# Patient Record
Sex: FEMALE | Race: ASIAN | NOT HISPANIC OR LATINO | URBAN - METROPOLITAN AREA
[De-identification: names, ages, dates, MRNs, and addresses within clinical notes are randomized per-mention and may not be internally consistent; named-entity substitution may affect disease eponyms.]

---

## 2021-08-06 PROBLEM — E11.311 NONPROLIFERATIVE DIABETIC RETINOPATHY: Noted: 2021-08-06

## 2021-08-06 PROBLEM — H43.21 ASTEROID HYALOSIS: Noted: 2022-02-08

## 2021-08-06 PROBLEM — E11.3513 PROLIFERATIVE DIABETIC RETINOPATHY: Noted: 2022-02-08

## 2021-08-06 PROBLEM — E11.311 DIABETES, TYPE II WITH OCULAR COMPLICATIONS: Noted: 2022-02-08

## 2021-08-06 PROBLEM — H25.13 NS CATARACT: Noted: 2022-02-08

## 2021-08-06 PROBLEM — H43.13 VITREOUS HEMORRHAGE: Noted: 2022-02-08

## 2021-08-06 PROBLEM — E11.3513 DIABETES, TYPE II WITH OCULAR COMPLICATIONS: Noted: 2022-02-08

## 2021-08-06 PROBLEM — E11.311 DIABETIC MACULAR EDEMA: Noted: 2022-02-11

## 2021-08-06 PROBLEM — E11.3513 DIABETIC MACULAR EDEMA: Noted: 2022-02-11

## 2021-09-22 ENCOUNTER — APPOINTMENT (RX ONLY)
Dept: URBAN - METROPOLITAN AREA CLINIC 38 | Facility: CLINIC | Age: 61
Setting detail: DERMATOLOGY
End: 2021-09-22

## 2021-09-22 DIAGNOSIS — Z00.00 ENCOUNTER FOR GENERAL ADULT MEDICAL EXAMINATION WITHOUT ABNORMAL FINDINGS: ICD-10-CM

## 2021-09-22 DIAGNOSIS — L98.8 OTHER SPECIFIED DISORDERS OF THE SKIN AND SUBCUTANEOUS TISSUE: ICD-10-CM

## 2021-09-22 DIAGNOSIS — I78.8 OTHER DISEASES OF CAPILLARIES: ICD-10-CM

## 2021-09-22 PROBLEM — Z71.1 PERSON WITH FEARED HEALTH COMPLAINT IN WHOM NO DIAGNOSIS IS MADE: Status: ACTIVE | Noted: 2021-09-22

## 2021-09-22 PROCEDURE — ? PRESCRIPTION

## 2021-09-22 PROCEDURE — ? OTHER

## 2021-09-22 PROCEDURE — ? TREATMENT REGIMEN

## 2021-09-22 PROCEDURE — 99202 OFFICE O/P NEW SF 15 MIN: CPT

## 2021-09-22 PROCEDURE — ? ADDITIONAL NOTES

## 2021-09-22 PROCEDURE — ? COUNSELING

## 2021-09-22 RX ORDER — HYDROQUINONE 4 %
CREAM (GRAM) TOPICAL
Qty: 30 | Refills: 0 | Status: ERX | COMMUNITY
Start: 2021-09-22

## 2021-09-22 RX ADMIN — Medication: at 00:00

## 2021-09-22 ASSESSMENT — LOCATION DETAILED DESCRIPTION DERM
LOCATION DETAILED: NASAL TIP
LOCATION DETAILED: LEFT VENTRAL DISTAL FOREARM
LOCATION DETAILED: RIGHT VENTRAL DISTAL FOREARM
LOCATION DETAILED: LEFT SUPERIOR MEDIAL MALAR CHEEK
LOCATION DETAILED: RIGHT CENTRAL BUCCAL CHEEK
LOCATION DETAILED: RIGHT SUPERIOR MEDIAL MALAR CHEEK
LOCATION DETAILED: LEFT CENTRAL BUCCAL CHEEK

## 2021-09-22 ASSESSMENT — LOCATION SIMPLE DESCRIPTION DERM
LOCATION SIMPLE: RIGHT FOREARM
LOCATION SIMPLE: LEFT CHEEK
LOCATION SIMPLE: NOSE
LOCATION SIMPLE: LEFT FOREARM
LOCATION SIMPLE: RIGHT CHEEK

## 2021-09-22 ASSESSMENT — LOCATION ZONE DERM
LOCATION ZONE: ARM
LOCATION ZONE: NOSE
LOCATION ZONE: FACE

## 2021-09-22 NOTE — PROCEDURE: OTHER
Other (Free Text): To plastics to discuss treatment options
Detail Level: Detailed
Render Risk Assessment In Note?: no
Note Text (......Xxx Chief Complaint.): This diagnosis correlates with the
Other (Free Text): Advised that when a new lesion appears to RV for exam/ biopsy as indicated

## 2021-09-22 NOTE — PROCEDURE: TREATMENT REGIMEN
Plan: There is no growth or rough skin at all present today or per history. This is a flat blanching patch. Discussed she should have ENT to internal exam to ensure everything looks normal. \\nDiscussed laser options.
Detail Level: Zone
Plan: Filler discussed

## 2021-09-22 NOTE — PROCEDURE: MIPS QUALITY
Quality 110: Preventive Care And Screening: Influenza Immunization: Influenza Immunization previously received during influenza season
Quality 226: Preventive Care And Screening: Tobacco Use: Screening And Cessation Intervention: Patient screened for tobacco use and is an ex/non-smoker
Quality 130: Documentation Of Current Medications In The Medical Record: Current Medications Documented
Detail Level: Detailed
Quality 431: Preventive Care And Screening: Unhealthy Alcohol Use - Screening: Patient not identified as an unhealthy alcohol user when screened for unhealthy alcohol use using a systematic screening method

## 2021-09-22 NOTE — HPI: SKIN LESION
What Type Of Note Output Would You Prefer (Optional)?: Standard Output
How Severe Is Your Skin Lesion?: moderate
Has Your Skin Lesion Been Treated?: not been treated
Is This A New Presentation, Or A Follow-Up?: Skin Lesion
Additional History: Pt tried Triamcinolone, didn’t help.\\n\\nPatient was screened for COVID-19. Patient answered No to coming in contact with someone with COVID-19, and hasn’t tested positive for COVID-19.
How Severe Is Your Skin Lesion?: mild
Is This A New Presentation, Or A Follow-Up?: Growth

## 2022-02-08 PROBLEM — H43.21 ASTEROID HYALOSIS: Noted: 2022-02-08

## 2022-02-08 PROBLEM — E11.3513 DIABETIC MACULAR EDEMA: Status: RESOLVED | Noted: 2022-02-11 | Resolved: 2022-02-11

## 2022-02-08 PROBLEM — E11.3553 STABLE PDR: Noted: 2022-02-08 | Resolved: 2022-02-11

## 2022-02-08 PROBLEM — E11.3513 DIABETES, TYPE II WITH OCULAR COMPLICATIONS: Noted: 2022-02-08 | Resolved: 2022-02-11

## 2022-02-08 PROBLEM — E11.3553 DIABETIC MACULAR EDEMA: Status: RESOLVED | Noted: 2022-02-11 | Resolved: 2022-02-11

## 2022-02-08 PROBLEM — E11.3513 STABLE PDR: Noted: 2022-02-08

## 2022-02-08 PROBLEM — E11.3553 DIABETIC MACULAR EDEMA: Noted: 2022-02-11

## 2022-02-08 PROBLEM — H25.13 NUCLEAR SCLEROSIS: Noted: 2022-02-08 | Resolved: 2022-02-11

## 2022-02-08 PROBLEM — E11.3513 DIABETES, TYPE II WITH OCULAR COMPLICATIONS: Noted: 2022-02-08

## 2022-02-08 PROBLEM — E11.3513 STABLE PDR: Noted: 2022-02-08 | Resolved: 2022-02-11

## 2022-02-08 PROBLEM — H25.13 NS CATARACT: Noted: 2022-02-08

## 2022-02-08 PROBLEM — E11.3513 DIABETIC MACULAR EDEMA: Noted: 2022-02-11

## 2022-02-08 PROBLEM — E11.3553 DIABETES, TYPE II WITH OCULAR COMPLICATIONS: Noted: 2022-02-08 | Resolved: 2022-02-11

## 2022-02-08 PROBLEM — E11.3553 DIABETES, TYPE II WITH OCULAR COMPLICATIONS: Noted: 2022-02-08

## 2022-02-08 PROBLEM — H43.21 ASTEROID HYALOSIS: Noted: 2022-02-08 | Resolved: 2022-02-11

## 2022-02-08 PROBLEM — H43.13 VITREOUS HEMORRHAGE: Noted: 2022-02-08

## 2022-02-08 PROBLEM — H25.13 NS CATARACT: Noted: 2022-02-08 | Resolved: 2022-02-11

## 2022-02-08 PROBLEM — E11.3553 STABLE PDR: Noted: 2022-02-08

## 2022-02-08 PROBLEM — E11.3513 DIABETIC MACULAR EDEMA: Noted: 2022-02-11 | Resolved: 2022-02-11

## 2022-02-08 PROBLEM — E11.3553 DIABETIC MACULAR EDEMA: Noted: 2022-02-11 | Resolved: 2022-02-11

## 2022-02-11 ENCOUNTER — PREPPED CHART (OUTPATIENT)
Dept: URBAN - METROPOLITAN AREA CLINIC 101 | Facility: CLINIC | Age: 62
End: 2022-02-11

## 2022-02-11 PROBLEM — E11.3513 STABLE PDR: Noted: 2022-02-08 | Resolved: 2022-02-11

## 2022-02-11 PROBLEM — E11.3553 DIABETIC MACULAR EDEMA: Status: RESOLVED | Noted: 2022-02-11 | Resolved: 2022-02-11

## 2022-02-11 PROBLEM — H43.21 ASTEROID HYALOSIS: Noted: 2022-02-08 | Resolved: 2022-02-11

## 2022-02-11 PROBLEM — E11.3513 DIABETIC MACULAR EDEMA: Status: RESOLVED | Noted: 2022-02-11 | Resolved: 2022-02-11

## 2022-02-11 PROBLEM — E11.3553 STABLE PDR: Noted: 2022-02-08 | Resolved: 2022-02-11

## 2022-02-11 PROBLEM — E11.3553 DIABETES, TYPE II WITH OCULAR COMPLICATIONS: Noted: 2022-02-08 | Resolved: 2022-02-11

## 2022-02-11 PROBLEM — E11.3513 DIABETES, TYPE II WITH OCULAR COMPLICATIONS: Noted: 2022-02-08 | Resolved: 2022-02-11

## 2022-02-11 PROBLEM — H25.12 NUCLEAR SCLEROSIS: Noted: 2022-02-08 | Resolved: 2022-02-11

## 2022-02-11 PROBLEM — H25.13 NUCLEAR SCLEROSIS: Noted: 2022-02-08 | Resolved: 2022-02-11

## 2022-03-07 ASSESSMENT — VISUAL ACUITY
OS_CC: 20/40
OD_CC: 20/50-1

## 2022-03-07 ASSESSMENT — TONOMETRY
OS_IOP_MMHG: 16
OD_IOP_MMHG: 18

## 2022-03-11 ENCOUNTER — FOLLOW UP (OUTPATIENT)
Dept: URBAN - METROPOLITAN AREA CLINIC 101 | Facility: CLINIC | Age: 62
End: 2022-03-11

## 2022-03-11 DIAGNOSIS — E11.3553: ICD-10-CM

## 2022-03-11 DIAGNOSIS — E11.3513: ICD-10-CM

## 2022-03-11 DIAGNOSIS — H43.21: ICD-10-CM

## 2022-03-11 DIAGNOSIS — H25.13: ICD-10-CM

## 2022-03-11 PROCEDURE — 92202 OPSCPY EXTND ON/MAC DRAW: CPT | Mod: 59

## 2022-03-11 PROCEDURE — 67028 INJECTION EYE DRUG: CPT

## 2022-03-11 PROCEDURE — PFS EYLEA PFS

## 2022-03-11 PROCEDURE — 92134 CPTRZ OPH DX IMG PST SGM RTA: CPT

## 2022-03-11 PROCEDURE — 92014 COMPRE OPH EXAM EST PT 1/>: CPT | Mod: 25

## 2022-03-11 ASSESSMENT — TONOMETRY
OD_IOP_MMHG: 14
OS_IOP_MMHG: 14

## 2022-03-11 ASSESSMENT — VISUAL ACUITY
OD_CC: 20/50
OS_CC: 20/40+1

## 2022-03-15 ENCOUNTER — INJECTION ONLY (OUTPATIENT)
Dept: URBAN - METROPOLITAN AREA CLINIC 101 | Facility: CLINIC | Age: 62
End: 2022-03-15

## 2022-03-15 DIAGNOSIS — E11.3513: ICD-10-CM

## 2022-03-15 PROCEDURE — 67028 INJECTION EYE DRUG: CPT

## 2022-03-15 PROCEDURE — PFS EYLEA PFS

## 2022-03-15 ASSESSMENT — VISUAL ACUITY
OD_CC: 20/30
OS_CC: 20/25-2

## 2022-03-15 ASSESSMENT — TONOMETRY
OS_IOP_MMHG: 20
OD_IOP_MMHG: 18

## 2022-04-12 ENCOUNTER — FOLLOW UP (OUTPATIENT)
Dept: URBAN - METROPOLITAN AREA CLINIC 101 | Facility: CLINIC | Age: 62
End: 2022-04-12

## 2022-04-12 DIAGNOSIS — E11.3553: ICD-10-CM

## 2022-04-12 DIAGNOSIS — E11.3513: ICD-10-CM

## 2022-04-12 DIAGNOSIS — H43.13: ICD-10-CM

## 2022-04-12 PROCEDURE — 67028 INJECTION EYE DRUG: CPT

## 2022-04-12 PROCEDURE — PFS EYLEA PFS

## 2022-04-12 PROCEDURE — 92202 OPSCPY EXTND ON/MAC DRAW: CPT | Mod: 59

## 2022-04-12 PROCEDURE — 92014 COMPRE OPH EXAM EST PT 1/>: CPT | Mod: 25

## 2022-04-12 PROCEDURE — 92134 CPTRZ OPH DX IMG PST SGM RTA: CPT

## 2022-04-12 ASSESSMENT — VISUAL ACUITY
OD_CC: 20/40-1
OS_CC: 20/25+2
OD_PH: 20/30-1

## 2022-04-12 ASSESSMENT — TONOMETRY
OS_IOP_MMHG: 10
OD_IOP_MMHG: 11

## 2022-04-19 ENCOUNTER — LASER ONLY (OUTPATIENT)
Dept: URBAN - METROPOLITAN AREA CLINIC 101 | Facility: CLINIC | Age: 62
End: 2022-04-19

## 2022-04-19 DIAGNOSIS — H43.13: ICD-10-CM

## 2022-04-19 PROCEDURE — 67228 TREATMENT X10SV RETINOPATHY: CPT

## 2022-04-19 ASSESSMENT — TONOMETRY: OD_IOP_MMHG: 14

## 2022-04-19 ASSESSMENT — VISUAL ACUITY: OD_CC: 20/40-1

## 2022-05-17 ENCOUNTER — FOLLOW UP (OUTPATIENT)
Dept: URBAN - METROPOLITAN AREA CLINIC 101 | Facility: CLINIC | Age: 62
End: 2022-05-17

## 2022-05-17 DIAGNOSIS — E11.3553: ICD-10-CM

## 2022-05-17 DIAGNOSIS — E11.3513: ICD-10-CM

## 2022-05-17 DIAGNOSIS — H43.13: ICD-10-CM

## 2022-05-17 DIAGNOSIS — H25.13: ICD-10-CM

## 2022-05-17 DIAGNOSIS — H43.21: ICD-10-CM

## 2022-05-17 PROCEDURE — 92202 OPSCPY EXTND ON/MAC DRAW: CPT

## 2022-05-17 PROCEDURE — 92134 CPTRZ OPH DX IMG PST SGM RTA: CPT

## 2022-05-17 PROCEDURE — 92014 COMPRE OPH EXAM EST PT 1/>: CPT

## 2022-05-17 ASSESSMENT — VISUAL ACUITY
OS_CC: 20/25-1
OD_CC: 20/30-1

## 2022-05-17 ASSESSMENT — TONOMETRY
OS_IOP_MMHG: 18
OD_IOP_MMHG: 19

## 2022-06-24 ENCOUNTER — FOLLOW UP (OUTPATIENT)
Dept: URBAN - METROPOLITAN AREA CLINIC 101 | Facility: CLINIC | Age: 62
End: 2022-06-24

## 2022-06-24 DIAGNOSIS — E11.3553: ICD-10-CM

## 2022-06-24 DIAGNOSIS — E11.3513: ICD-10-CM

## 2022-06-24 DIAGNOSIS — H43.13: ICD-10-CM

## 2022-06-24 DIAGNOSIS — H25.13: ICD-10-CM

## 2022-06-24 PROCEDURE — 92134 CPTRZ OPH DX IMG PST SGM RTA: CPT

## 2022-06-24 PROCEDURE — PFS EYLEA PFS

## 2022-06-24 PROCEDURE — 67028 INJECTION EYE DRUG: CPT

## 2022-06-24 PROCEDURE — 92014 COMPRE OPH EXAM EST PT 1/>: CPT | Mod: 25

## 2022-06-24 PROCEDURE — 92202 OPSCPY EXTND ON/MAC DRAW: CPT | Mod: 59

## 2022-06-24 ASSESSMENT — VISUAL ACUITY
OS_CC: 20/30
OD_CC: 20/50+2

## 2022-06-24 ASSESSMENT — TONOMETRY
OD_IOP_MMHG: 14
OS_IOP_MMHG: 16

## 2022-07-22 ENCOUNTER — FOLLOW UP (OUTPATIENT)
Dept: URBAN - METROPOLITAN AREA CLINIC 101 | Facility: CLINIC | Age: 62
End: 2022-07-22

## 2022-07-22 DIAGNOSIS — E11.3553: ICD-10-CM

## 2022-07-22 DIAGNOSIS — E11.3513: ICD-10-CM

## 2022-07-22 DIAGNOSIS — H43.21: ICD-10-CM

## 2022-07-22 DIAGNOSIS — H43.13: ICD-10-CM

## 2022-07-22 DIAGNOSIS — H25.13: ICD-10-CM

## 2022-07-22 PROCEDURE — 67028 INJECTION EYE DRUG: CPT

## 2022-07-22 PROCEDURE — PFS EYLEA PFS

## 2022-07-22 PROCEDURE — 92202 OPSCPY EXTND ON/MAC DRAW: CPT | Mod: 59

## 2022-07-22 PROCEDURE — 92134 CPTRZ OPH DX IMG PST SGM RTA: CPT

## 2022-07-22 PROCEDURE — 92014 COMPRE OPH EXAM EST PT 1/>: CPT | Mod: 25

## 2022-07-22 ASSESSMENT — VISUAL ACUITY
OS_PH: 20/20-2
OD_CC: 20/40-1
OS_CC: 20/25

## 2022-07-22 ASSESSMENT — TONOMETRY
OS_IOP_MMHG: 12
OD_IOP_MMHG: 15

## 2022-09-02 ENCOUNTER — FOLLOW UP (OUTPATIENT)
Dept: URBAN - METROPOLITAN AREA CLINIC 101 | Facility: CLINIC | Age: 62
End: 2022-09-02

## 2022-09-02 DIAGNOSIS — E11.3513: ICD-10-CM

## 2022-09-02 DIAGNOSIS — H43.13: ICD-10-CM

## 2022-09-02 DIAGNOSIS — H25.13: ICD-10-CM

## 2022-09-02 DIAGNOSIS — E11.3553: ICD-10-CM

## 2022-09-02 PROCEDURE — 92014 COMPRE OPH EXAM EST PT 1/>: CPT | Mod: 25

## 2022-09-02 PROCEDURE — 67028 INJECTION EYE DRUG: CPT

## 2022-09-02 PROCEDURE — PFS EYLEA PFS

## 2022-09-02 PROCEDURE — 92202 OPSCPY EXTND ON/MAC DRAW: CPT | Mod: 59

## 2022-09-02 PROCEDURE — 92134 CPTRZ OPH DX IMG PST SGM RTA: CPT

## 2022-09-02 ASSESSMENT — VISUAL ACUITY
OD_CC: 20/30+2
OS_CC: 20/25
OS_PH: 20/20

## 2022-09-02 ASSESSMENT — TONOMETRY
OS_IOP_MMHG: 13
OD_IOP_MMHG: 15

## 2022-09-30 ENCOUNTER — FOLLOW UP (OUTPATIENT)
Dept: URBAN - METROPOLITAN AREA CLINIC 101 | Facility: CLINIC | Age: 62
End: 2022-09-30

## 2022-09-30 DIAGNOSIS — E11.3513: ICD-10-CM

## 2022-09-30 DIAGNOSIS — H25.13: ICD-10-CM

## 2022-09-30 DIAGNOSIS — E11.3553: ICD-10-CM

## 2022-09-30 DIAGNOSIS — H43.13: ICD-10-CM

## 2022-09-30 PROCEDURE — 92134 CPTRZ OPH DX IMG PST SGM RTA: CPT

## 2022-09-30 PROCEDURE — 92014 COMPRE OPH EXAM EST PT 1/>: CPT

## 2022-09-30 PROCEDURE — 92202 OPSCPY EXTND ON/MAC DRAW: CPT

## 2022-09-30 ASSESSMENT — VISUAL ACUITY
OD_CC: 20/30-3
OS_CC: 20/25-1

## 2022-09-30 ASSESSMENT — TONOMETRY
OS_IOP_MMHG: 12
OD_IOP_MMHG: 16

## 2022-11-11 ENCOUNTER — FOLLOW UP (OUTPATIENT)
Dept: URBAN - METROPOLITAN AREA CLINIC 101 | Facility: CLINIC | Age: 62
End: 2022-11-11

## 2022-11-11 DIAGNOSIS — H43.21: ICD-10-CM

## 2022-11-11 DIAGNOSIS — E11.3553: ICD-10-CM

## 2022-11-11 DIAGNOSIS — H43.13: ICD-10-CM

## 2022-11-11 DIAGNOSIS — H25.13: ICD-10-CM

## 2022-11-11 DIAGNOSIS — E11.3513: ICD-10-CM

## 2022-11-11 PROCEDURE — 92202 OPSCPY EXTND ON/MAC DRAW: CPT

## 2022-11-11 PROCEDURE — 92134 CPTRZ OPH DX IMG PST SGM RTA: CPT

## 2022-11-11 PROCEDURE — 92014 COMPRE OPH EXAM EST PT 1/>: CPT

## 2022-11-11 ASSESSMENT — TONOMETRY
OD_IOP_MMHG: 11
OS_IOP_MMHG: 12

## 2022-11-11 ASSESSMENT — VISUAL ACUITY
OD_CC: 20/60+2
OD_PH: 20/50-2
OS_CC: 20/30

## 2023-01-06 ENCOUNTER — FOLLOW UP (OUTPATIENT)
Dept: URBAN - METROPOLITAN AREA CLINIC 101 | Facility: CLINIC | Age: 63
End: 2023-01-06

## 2023-01-06 DIAGNOSIS — E11.3513: ICD-10-CM

## 2023-01-06 DIAGNOSIS — H43.21: ICD-10-CM

## 2023-01-06 DIAGNOSIS — H35.373: ICD-10-CM

## 2023-01-06 DIAGNOSIS — E11.3553: ICD-10-CM

## 2023-01-06 DIAGNOSIS — H43.13: ICD-10-CM

## 2023-01-06 DIAGNOSIS — H25.13: ICD-10-CM

## 2023-01-06 PROCEDURE — 92134 CPTRZ OPH DX IMG PST SGM RTA: CPT

## 2023-01-06 PROCEDURE — 92014 COMPRE OPH EXAM EST PT 1/>: CPT

## 2023-01-06 PROCEDURE — 92202 OPSCPY EXTND ON/MAC DRAW: CPT

## 2023-01-06 ASSESSMENT — VISUAL ACUITY
OD_CC: 20/50-2
OS_CC: 20/40-1

## 2023-01-06 ASSESSMENT — TONOMETRY
OS_IOP_MMHG: 12
OD_IOP_MMHG: 12

## 2023-04-07 ENCOUNTER — 3 MONTH FOLLOW UP (OUTPATIENT)
Dept: URBAN - METROPOLITAN AREA CLINIC 101 | Facility: CLINIC | Age: 63
End: 2023-04-07

## 2023-04-07 DIAGNOSIS — H35.373: ICD-10-CM

## 2023-04-07 DIAGNOSIS — H25.13: ICD-10-CM

## 2023-04-07 DIAGNOSIS — E11.3553: ICD-10-CM

## 2023-04-07 DIAGNOSIS — H43.21: ICD-10-CM

## 2023-04-07 DIAGNOSIS — E11.3513: ICD-10-CM

## 2023-04-07 DIAGNOSIS — H43.13: ICD-10-CM

## 2023-04-07 PROCEDURE — 92202 OPSCPY EXTND ON/MAC DRAW: CPT

## 2023-04-07 PROCEDURE — 92014 COMPRE OPH EXAM EST PT 1/>: CPT

## 2023-04-07 ASSESSMENT — TONOMETRY
OD_IOP_MMHG: 12
OS_IOP_MMHG: 12

## 2023-04-07 ASSESSMENT — VISUAL ACUITY
OS_SC: 20/25-2
OD_SC: 20/40-2

## 2023-07-07 ENCOUNTER — 3 MONTH FOLLOW UP (OUTPATIENT)
Dept: URBAN - METROPOLITAN AREA CLINIC 101 | Facility: CLINIC | Age: 63
End: 2023-07-07

## 2023-07-07 DIAGNOSIS — H35.373: ICD-10-CM

## 2023-07-07 DIAGNOSIS — E11.3553: ICD-10-CM

## 2023-07-07 DIAGNOSIS — E11.3513: ICD-10-CM

## 2023-07-07 DIAGNOSIS — H43.21: ICD-10-CM

## 2023-07-07 DIAGNOSIS — H25.13: ICD-10-CM

## 2023-07-07 DIAGNOSIS — H43.13: ICD-10-CM

## 2023-07-07 PROCEDURE — 92134 CPTRZ OPH DX IMG PST SGM RTA: CPT

## 2023-07-07 PROCEDURE — 92202 OPSCPY EXTND ON/MAC DRAW: CPT

## 2023-07-07 PROCEDURE — 92014 COMPRE OPH EXAM EST PT 1/>: CPT

## 2023-07-07 ASSESSMENT — TONOMETRY
OS_IOP_MMHG: 15
OD_IOP_MMHG: 14

## 2023-07-07 ASSESSMENT — VISUAL ACUITY
OD_CC: 20/30-3
OS_CC: 20/30

## 2023-10-20 ENCOUNTER — FOLLOW UP (OUTPATIENT)
Dept: URBAN - METROPOLITAN AREA CLINIC 101 | Facility: CLINIC | Age: 63
End: 2023-10-20

## 2023-10-20 DIAGNOSIS — H25.13: ICD-10-CM

## 2023-10-20 DIAGNOSIS — E11.3553: ICD-10-CM

## 2023-10-20 DIAGNOSIS — H35.373: ICD-10-CM

## 2023-10-20 DIAGNOSIS — E11.3513: ICD-10-CM

## 2023-10-20 DIAGNOSIS — H43.21: ICD-10-CM

## 2023-10-20 DIAGNOSIS — H43.13: ICD-10-CM

## 2023-10-20 PROCEDURE — 92134 CPTRZ OPH DX IMG PST SGM RTA: CPT

## 2023-10-20 PROCEDURE — 92202 OPSCPY EXTND ON/MAC DRAW: CPT

## 2023-10-20 PROCEDURE — 92014 COMPRE OPH EXAM EST PT 1/>: CPT

## 2023-10-20 ASSESSMENT — TONOMETRY
OD_IOP_MMHG: 15
OS_IOP_MMHG: 15

## 2023-10-20 ASSESSMENT — VISUAL ACUITY
OS_CC: 20/30-1
OD_CC: 20/40

## 2024-01-30 ENCOUNTER — FOLLOW UP (OUTPATIENT)
Dept: URBAN - METROPOLITAN AREA CLINIC 101 | Facility: CLINIC | Age: 64
End: 2024-01-30

## 2024-01-30 DIAGNOSIS — H43.13: ICD-10-CM

## 2024-01-30 DIAGNOSIS — H25.13: ICD-10-CM

## 2024-01-30 DIAGNOSIS — H43.21: ICD-10-CM

## 2024-01-30 DIAGNOSIS — H35.373: ICD-10-CM

## 2024-01-30 DIAGNOSIS — E11.3553: ICD-10-CM

## 2024-01-30 DIAGNOSIS — E11.3513: ICD-10-CM

## 2024-01-30 PROCEDURE — 92014 COMPRE OPH EXAM EST PT 1/>: CPT

## 2024-01-30 PROCEDURE — 92202 OPSCPY EXTND ON/MAC DRAW: CPT

## 2024-01-30 PROCEDURE — 92134 CPTRZ OPH DX IMG PST SGM RTA: CPT

## 2024-01-30 ASSESSMENT — TONOMETRY
OS_IOP_MMHG: 15
OD_IOP_MMHG: 17

## 2024-01-30 ASSESSMENT — VISUAL ACUITY
OD_CC: 20/50-2
OS_CC: 20/50-1

## 2024-04-30 ENCOUNTER — FOLLOW UP (OUTPATIENT)
Dept: URBAN - METROPOLITAN AREA CLINIC 101 | Facility: CLINIC | Age: 64
End: 2024-04-30

## 2024-04-30 DIAGNOSIS — E11.3553: ICD-10-CM

## 2024-04-30 DIAGNOSIS — H43.21: ICD-10-CM

## 2024-04-30 DIAGNOSIS — E11.3513: ICD-10-CM

## 2024-04-30 DIAGNOSIS — H43.13: ICD-10-CM

## 2024-04-30 DIAGNOSIS — H35.373: ICD-10-CM

## 2024-04-30 DIAGNOSIS — H25.13: ICD-10-CM

## 2024-04-30 PROCEDURE — 92202 OPSCPY EXTND ON/MAC DRAW: CPT

## 2024-04-30 PROCEDURE — 92014 COMPRE OPH EXAM EST PT 1/>: CPT

## 2024-04-30 PROCEDURE — 92134 CPTRZ OPH DX IMG PST SGM RTA: CPT

## 2024-04-30 ASSESSMENT — VISUAL ACUITY
OS_CC: 20/40-1
OD_CC: 20/40-2

## 2024-04-30 ASSESSMENT — TONOMETRY
OS_IOP_MMHG: 17
OD_IOP_MMHG: 18

## 2024-08-16 ENCOUNTER — FOLLOW UP (OUTPATIENT)
Dept: URBAN - METROPOLITAN AREA CLINIC 101 | Facility: CLINIC | Age: 64
End: 2024-08-16

## 2024-08-16 DIAGNOSIS — H43.21: ICD-10-CM

## 2024-08-16 DIAGNOSIS — E11.3553: ICD-10-CM

## 2024-08-16 DIAGNOSIS — H43.13: ICD-10-CM

## 2024-08-16 DIAGNOSIS — E11.3513: ICD-10-CM

## 2024-08-16 DIAGNOSIS — H25.13: ICD-10-CM

## 2024-08-16 DIAGNOSIS — H35.373: ICD-10-CM

## 2024-08-16 PROCEDURE — 92134 CPTRZ OPH DX IMG PST SGM RTA: CPT

## 2024-08-16 PROCEDURE — 92202 OPSCPY EXTND ON/MAC DRAW: CPT

## 2024-08-16 PROCEDURE — 92014 COMPRE OPH EXAM EST PT 1/>: CPT

## 2024-08-16 ASSESSMENT — TONOMETRY
OD_IOP_MMHG: 12
OS_IOP_MMHG: 10

## 2024-08-16 ASSESSMENT — VISUAL ACUITY
OS_CC: 20/40-1
OD_CC: 20/50-3

## 2025-03-07 ENCOUNTER — FOLLOW UP (OUTPATIENT)
Dept: URBAN - METROPOLITAN AREA CLINIC 101 | Facility: CLINIC | Age: 65
End: 2025-03-07

## 2025-03-07 DIAGNOSIS — E11.3513: ICD-10-CM

## 2025-03-07 DIAGNOSIS — H43.21: ICD-10-CM

## 2025-03-07 DIAGNOSIS — E11.3553: ICD-10-CM

## 2025-03-07 DIAGNOSIS — H35.373: ICD-10-CM

## 2025-03-07 DIAGNOSIS — H43.13: ICD-10-CM

## 2025-03-07 DIAGNOSIS — H25.13: ICD-10-CM

## 2025-03-07 PROCEDURE — 92014 COMPRE OPH EXAM EST PT 1/>: CPT

## 2025-03-07 PROCEDURE — 92134 CPTRZ OPH DX IMG PST SGM RTA: CPT

## 2025-03-07 PROCEDURE — 92202 OPSCPY EXTND ON/MAC DRAW: CPT

## 2025-03-07 ASSESSMENT — TONOMETRY
OS_IOP_MMHG: 18
OD_IOP_MMHG: 18

## 2025-03-07 ASSESSMENT — VISUAL ACUITY
OD_CC: 20/100
OS_CC: 20/50-1

## 2025-05-15 ENCOUNTER — FOLLOW UP (OUTPATIENT)
Dept: URBAN - METROPOLITAN AREA CLINIC 113 | Facility: CLINIC | Age: 65
End: 2025-05-15

## 2025-05-15 DIAGNOSIS — E11.3513: ICD-10-CM

## 2025-05-15 DIAGNOSIS — E11.3553: ICD-10-CM

## 2025-05-15 DIAGNOSIS — H25.12: ICD-10-CM

## 2025-05-15 DIAGNOSIS — H43.21: ICD-10-CM

## 2025-05-15 DIAGNOSIS — H35.373: ICD-10-CM

## 2025-05-15 DIAGNOSIS — H43.13: ICD-10-CM

## 2025-05-15 DIAGNOSIS — H35.351: ICD-10-CM

## 2025-05-15 PROCEDURE — 92014 COMPRE OPH EXAM EST PT 1/>: CPT

## 2025-05-15 PROCEDURE — 92202 OPSCPY EXTND ON/MAC DRAW: CPT

## 2025-05-15 PROCEDURE — 92137 CPTRZ OPH IMG PST SG RTA OCT: CPT

## 2025-05-15 ASSESSMENT — TONOMETRY
OS_IOP_MMHG: 15
OD_IOP_MMHG: 15

## 2025-05-15 ASSESSMENT — VISUAL ACUITY
OD_SC: 20/50-2
OS_SC: 20/50+2

## 2025-06-06 ENCOUNTER — FOLLOW UP (OUTPATIENT)
Dept: URBAN - METROPOLITAN AREA CLINIC 101 | Facility: CLINIC | Age: 65
End: 2025-06-06

## 2025-06-06 DIAGNOSIS — E11.3513: ICD-10-CM

## 2025-06-06 DIAGNOSIS — E11.3553: ICD-10-CM

## 2025-06-06 DIAGNOSIS — H43.21: ICD-10-CM

## 2025-06-06 DIAGNOSIS — H43.13: ICD-10-CM

## 2025-06-06 DIAGNOSIS — H35.351: ICD-10-CM

## 2025-06-06 DIAGNOSIS — Z96.1: ICD-10-CM

## 2025-06-06 DIAGNOSIS — H35.373: ICD-10-CM

## 2025-06-06 DIAGNOSIS — H25.12: ICD-10-CM

## 2025-06-06 PROCEDURE — 92202 OPSCPY EXTND ON/MAC DRAW: CPT

## 2025-06-06 PROCEDURE — 92134 CPTRZ OPH DX IMG PST SGM RTA: CPT

## 2025-06-06 PROCEDURE — 92014 COMPRE OPH EXAM EST PT 1/>: CPT

## 2025-06-06 ASSESSMENT — VISUAL ACUITY
OS_SC: 20/60-2
OD_SC: 20/40
OS_PH: 20/50-2

## 2025-06-06 ASSESSMENT — TONOMETRY
OD_IOP_MMHG: 15
OS_IOP_MMHG: 14

## 2025-07-02 ENCOUNTER — FOLLOW UP (OUTPATIENT)
Dept: URBAN - METROPOLITAN AREA CLINIC 85 | Facility: CLINIC | Age: 65
End: 2025-07-02

## 2025-07-02 DIAGNOSIS — E11.3513: ICD-10-CM

## 2025-07-02 DIAGNOSIS — H35.351: ICD-10-CM

## 2025-07-02 DIAGNOSIS — H43.21: ICD-10-CM

## 2025-07-02 DIAGNOSIS — H35.373: ICD-10-CM

## 2025-07-02 DIAGNOSIS — H43.13: ICD-10-CM

## 2025-07-02 DIAGNOSIS — E11.3553: ICD-10-CM

## 2025-07-02 DIAGNOSIS — H25.12: ICD-10-CM

## 2025-07-02 PROCEDURE — 92137 CPTRZ OPH IMG PST SG RTA OCT: CPT

## 2025-07-02 PROCEDURE — 92014 COMPRE OPH EXAM EST PT 1/>: CPT

## 2025-07-02 PROCEDURE — 92202 OPSCPY EXTND ON/MAC DRAW: CPT

## 2025-07-02 ASSESSMENT — TONOMETRY
OS_IOP_MMHG: 16
OD_IOP_MMHG: 15

## 2025-07-02 ASSESSMENT — VISUAL ACUITY
OS_PH: 20/50
OS_SC: 20/60-2
OD_SC: 20/30-2

## 2025-08-06 ENCOUNTER — FOLLOW UP (OUTPATIENT)
Dept: URBAN - METROPOLITAN AREA CLINIC 85 | Facility: CLINIC | Age: 65
End: 2025-08-06

## 2025-08-06 DIAGNOSIS — E11.3513: ICD-10-CM

## 2025-08-06 DIAGNOSIS — H25.12: ICD-10-CM

## 2025-08-06 DIAGNOSIS — H35.373: ICD-10-CM

## 2025-08-06 DIAGNOSIS — E11.3553: ICD-10-CM

## 2025-08-06 DIAGNOSIS — H35.351: ICD-10-CM

## 2025-08-06 DIAGNOSIS — H43.13: ICD-10-CM

## 2025-08-06 DIAGNOSIS — H43.21: ICD-10-CM

## 2025-08-06 PROCEDURE — 92014 COMPRE OPH EXAM EST PT 1/>: CPT

## 2025-08-06 PROCEDURE — 92134 CPTRZ OPH DX IMG PST SGM RTA: CPT

## 2025-08-06 PROCEDURE — 92202 OPSCPY EXTND ON/MAC DRAW: CPT

## 2025-08-06 ASSESSMENT — VISUAL ACUITY
OD_SC: 20/30
OS_SC: 20/60-2
OS_PH: 20/50-1

## 2025-08-06 ASSESSMENT — TONOMETRY
OD_IOP_MMHG: 16
OS_IOP_MMHG: 16

## 2025-08-13 ENCOUNTER — PROBLEM (OUTPATIENT)
Dept: URBAN - METROPOLITAN AREA CLINIC 85 | Facility: CLINIC | Age: 65
End: 2025-08-13

## 2025-08-13 DIAGNOSIS — E11.3513: ICD-10-CM

## 2025-08-13 DIAGNOSIS — H35.373: ICD-10-CM

## 2025-08-13 DIAGNOSIS — H43.21: ICD-10-CM

## 2025-08-13 DIAGNOSIS — E11.3553: ICD-10-CM

## 2025-08-13 DIAGNOSIS — H35.351: ICD-10-CM

## 2025-08-13 DIAGNOSIS — H43.13: ICD-10-CM

## 2025-08-13 DIAGNOSIS — H01.003: ICD-10-CM

## 2025-08-13 DIAGNOSIS — H25.12: ICD-10-CM

## 2025-08-13 PROCEDURE — 92137 CPTRZ OPH IMG PST SG RTA OCT: CPT

## 2025-08-13 PROCEDURE — 92014 COMPRE OPH EXAM EST PT 1/>: CPT

## 2025-08-13 PROCEDURE — 92202 OPSCPY EXTND ON/MAC DRAW: CPT

## 2025-08-13 ASSESSMENT — TONOMETRY
OS_IOP_MMHG: 13
OD_IOP_MMHG: 14

## 2025-08-13 ASSESSMENT — VISUAL ACUITY
OS_CC: 20/70
OD_SC: 20/30
OS_PH: 20/40-1

## (undated) RX ORDER — BROMFENAC 0.9 MG/ML
1 SOLUTION/ DROPS OPHTHALMIC TWICE A DAY
Start: 2025-06-06

## (undated) RX ORDER — BROMFENAC 0.9 MG/ML: 1 SOLUTION/ DROPS OPHTHALMIC TWICE A DAY

## (undated) RX ORDER — KETOROLAC TROMETHAMINE 5 MG/ML
1 SOLUTION OPHTHALMIC
Start: 2025-05-15

## (undated) RX ORDER — PREDNISOLONE ACETATE 10 MG/ML
1 SUSPENSION/ DROPS OPHTHALMIC
Start: 2025-05-15